# Patient Record
Sex: FEMALE | Race: WHITE | NOT HISPANIC OR LATINO | URBAN - METROPOLITAN AREA
[De-identification: names, ages, dates, MRNs, and addresses within clinical notes are randomized per-mention and may not be internally consistent; named-entity substitution may affect disease eponyms.]

---

## 2020-01-03 ENCOUNTER — APPOINTMENT (OUTPATIENT)
Dept: URBAN - METROPOLITAN AREA SURGERY 18 | Age: 70
Setting detail: DERMATOLOGY
End: 2020-01-06

## 2020-01-03 DIAGNOSIS — L30.9 DERMATITIS, UNSPECIFIED: ICD-10-CM

## 2020-01-03 DIAGNOSIS — L29.9 PRURITUS, UNSPECIFIED: ICD-10-CM

## 2020-01-03 PROCEDURE — OTHER MEDICATION COUNSELING: OTHER

## 2020-01-03 PROCEDURE — OTHER PRESCRIPTION: OTHER

## 2020-01-03 PROCEDURE — OTHER BIOPSY BY PUNCH METHOD: OTHER

## 2020-01-03 PROCEDURE — OTHER DIAGNOSIS COMMENT: OTHER

## 2020-01-03 PROCEDURE — 11104 PUNCH BX SKIN SINGLE LESION: CPT

## 2020-01-03 PROCEDURE — 99203 OFFICE O/P NEW LOW 30 MIN: CPT | Mod: 25

## 2020-01-03 PROCEDURE — OTHER COUNSELING: OTHER

## 2020-01-03 RX ORDER — CLOBETASOL PROPIONATE 0.5 MG/G
OINTMENT TOPICAL
Qty: 1 | Refills: 2 | Status: ERX

## 2020-01-03 RX ORDER — HYDROCORTISONE 25 MG/G
CREAM TOPICAL
Qty: 1 | Refills: 0 | Status: ERX

## 2020-01-03 ASSESSMENT — LOCATION ZONE DERM
LOCATION ZONE: LEG
LOCATION ZONE: NECK
LOCATION ZONE: TRUNK
LOCATION ZONE: ARM
LOCATION ZONE: FACE

## 2020-01-03 ASSESSMENT — LOCATION DETAILED DESCRIPTION DERM
LOCATION DETAILED: RIGHT PROXIMAL PRETIBIAL REGION
LOCATION DETAILED: INFERIOR THORACIC SPINE
LOCATION DETAILED: RIGHT INFERIOR LATERAL NECK
LOCATION DETAILED: RIGHT ANTECUBITAL SKIN
LOCATION DETAILED: RIGHT PROXIMAL DORSAL FOREARM
LOCATION DETAILED: LEFT PROXIMAL PRETIBIAL REGION
LOCATION DETAILED: LOWER STERNUM
LOCATION DETAILED: LEFT PROXIMAL DORSAL FOREARM
LOCATION DETAILED: RIGHT INFERIOR CENTRAL MALAR CHEEK

## 2020-01-03 ASSESSMENT — LOCATION SIMPLE DESCRIPTION DERM
LOCATION SIMPLE: CHEST
LOCATION SIMPLE: RIGHT PRETIBIAL REGION
LOCATION SIMPLE: RIGHT UPPER ARM
LOCATION SIMPLE: RIGHT FOREARM
LOCATION SIMPLE: RIGHT ANTERIOR NECK
LOCATION SIMPLE: LEFT PRETIBIAL REGION
LOCATION SIMPLE: RIGHT CHEEK
LOCATION SIMPLE: UPPER BACK
LOCATION SIMPLE: LEFT FOREARM

## 2020-01-03 NOTE — PROCEDURE: BIOPSY BY PUNCH METHOD
Bill For Surgical Tray: no
X Depth Of Punch In Cm (Optional): 0
Home Suture Removal Text: Patient was provided a home suture removal kit and will remove their sutures at home.  If they have any questions or difficulties they will call the office.
Dressing: bandage
Biopsy Type: H and E
Consent: Written consent was obtained and risks were reviewed including but not limited to scarring, infection, bleeding, scabbing, incomplete removal, nerve damage and allergy to anesthesia.
Path Notes (To The Dermatopathologist): Patient has MDS not doing chemo
Detail Level: Detailed
Notification Instructions: Patient will be notified of biopsy results. However, patient instructed to call the office if not contacted within 2 weeks.
Wound Care: Petrolatum
Post-Care Instructions: I reviewed with the patient in detail post-care instructions. Patient is to keep the biopsy site dry overnight, and then apply bacitracin twice daily until healed. Patient may apply hydrogen peroxide soaks to remove any crusting.
Hemostasis: None
Suture Removal: 10 days
Anesthesia Volume In Cc (Will Not Render If 0): 0.5
Punch Size In Mm: 4
Was A Bandage Applied: Yes
Epidermal Sutures: 4-0 Prolene
Billing Type: Third-Party Bill
Anesthesia Type: 1% lidocaine with epinephrine

## 2020-01-03 NOTE — PROCEDURE: MEDICATION COUNSELING
Xelmarthaz Pregnancy And Lactation Text: This medication is Pregnancy Category D and is not considered safe during pregnancy.  The risk during breast feeding is also uncertain.

## 2020-01-03 NOTE — PROCEDURE: DIAGNOSIS COMMENT
Comment: H/o MDS with neutropenia and thrombocytopenia, managed by Oncology.  Dermatitis due to unclear etiology.  Differential Diagnosis includes Sweet's vs Vasculitis vs other.  Biopsy today.  Start topical steroids.  F/u in 10 days.
Detail Level: Simple
Comment: Pt has h/o MDS which is managed by heme/onc.  Pruritus is likely related to dermatitis.  Labs from 12/2019 reviewed and scanned in.  Will treat dermatitis and see if pruritus improves.  Start topical steroids. \\n F/u in 10 days.

## 2020-01-07 ENCOUNTER — APPOINTMENT (OUTPATIENT)
Dept: URBAN - METROPOLITAN AREA SURGERY 18 | Age: 70
Setting detail: DERMATOLOGY
End: 2020-01-10

## 2020-01-07 ENCOUNTER — RX ONLY (RX ONLY)
Age: 70
End: 2020-01-07

## 2020-01-07 DIAGNOSIS — T81.89 OTHER COMPLICATIONS OF PROCEDURES, NOT ELSEWHERE CLASSIFIED: ICD-10-CM

## 2020-01-07 PROBLEM — T81.89XA OTHER COMPLICATIONS OF PROCEDURES, NOT ELSEWHERE CLASSIFIED, INITIAL ENCOUNTER: Status: ACTIVE | Noted: 2020-01-07

## 2020-01-07 PROCEDURE — OTHER DIAGNOSIS COMMENT: OTHER

## 2020-01-07 PROCEDURE — OTHER MEDICATION COUNSELING: OTHER

## 2020-01-07 PROCEDURE — OTHER SUTURE REMOVAL (NO GLOBAL PERIOD): OTHER

## 2020-01-07 PROCEDURE — OTHER ORDER TESTS: OTHER

## 2020-01-07 PROCEDURE — OTHER OTHER: OTHER

## 2020-01-07 PROCEDURE — OTHER COUNSELING: OTHER

## 2020-01-07 PROCEDURE — OTHER PRESCRIPTION: OTHER

## 2020-01-07 PROCEDURE — 99213 OFFICE O/P EST LOW 20 MIN: CPT

## 2020-01-07 RX ORDER — MUPIROCIN 20 MG/G
OINTMENT TOPICAL
Qty: 1 | Refills: 0 | Status: ERX

## 2020-01-07 RX ORDER — BETAMETHASONE DIPROPIONATE 0.5 MG/G
OINTMENT TOPICAL
Qty: 1 | Refills: 2 | Status: ERX

## 2020-01-07 ASSESSMENT — LOCATION ZONE DERM: LOCATION ZONE: ARM

## 2020-01-07 ASSESSMENT — LOCATION DETAILED DESCRIPTION DERM: LOCATION DETAILED: RIGHT ANTECUBITAL SKIN

## 2020-01-07 ASSESSMENT — LOCATION SIMPLE DESCRIPTION DERM: LOCATION SIMPLE: RIGHT UPPER ARM

## 2020-01-07 NOTE — PROCEDURE: DIAGNOSIS COMMENT
Comment: Pt has a significant amount of edema at the site of a previous punch biopsy that was done on 01/03/20.  Unclear etiology of edema. Differential diagnosis includes infectious etiology vs hematoma. \\n Biopsy was done to evaluate dermatitis.  Suture was removed today.  No purulence.  Bacterial culture taken.  Start Mupirocin ointment and Doxcycline.  She has an appointment for an US this afternoon, which was ordered by her oncologist Dr. Charal MD, she should keep this apt.  She feels well today but if she develops fever, chills, n/v/d she should go the ER for evaluation.  F/u with me in 3 days.  Dr. Gerardo Villalobos evaluated the patient today as well and agrees with this plan. Comment: Pt has a significant amount of edema at the site of a previous punch biopsy that was done on 01/03/20.  Unclear etiology of edema. Differential diagnosis includes infectious etiology vs hematoma. \\n Biopsy was done to evaluate dermatitis.  Suture was removed today.  No purulence.  Bacterial culture taken.  Start Mupirocin ointment and Doxcycline.  She has an appointment for an US this afternoon, which was ordered by her oncologist Dr. Charla MD, she should keep this apt.  She feels well today but if she develops fever, chills, n/v/d she should go the ER for evaluation.  F/u with me in 3 days.  Dr. Gerardo Villalobos evaluated the patient today as well and agrees with this plan.

## 2020-01-07 NOTE — PROCEDURE: OTHER
Detail Level: Zone
Note Text (......Xxx Chief Complaint.): This diagnosis correlates with the
Other (Free Text): Keep Ultrasound this afternoon

## 2020-01-07 NOTE — HPI: WOUND, UPPER EXTREMITY
Is The Wound New Or Recurrent?: new
How Severe Is It?: moderate
Is This A New Presentation, Or A Follow-Up?: Upper Extremity Wound
Additional History: Patient is concerned regarding the biopsy site. Patient saw her Hematologists yesterday and ordered a Ultrasound this afternoon.

## 2020-01-08 ENCOUNTER — APPOINTMENT (OUTPATIENT)
Dept: URBAN - METROPOLITAN AREA SURGERY 18 | Age: 70
Setting detail: DERMATOLOGY
End: 2020-01-10

## 2020-01-08 DIAGNOSIS — L30.9 DERMATITIS, UNSPECIFIED: ICD-10-CM

## 2020-01-08 PROCEDURE — OTHER COUNSELING: OTHER

## 2020-01-08 PROCEDURE — OTHER BIOPSY BY PUNCH METHOD: OTHER

## 2020-01-08 PROCEDURE — OTHER TREATMENT REGIMEN: OTHER

## 2020-01-08 PROCEDURE — OTHER PRESCRIPTION: OTHER

## 2020-01-08 PROCEDURE — 11104 PUNCH BX SKIN SINGLE LESION: CPT

## 2020-01-08 RX ORDER — BETAMETHASONE DIPROPIONATE 0.5 MG/G
OINTMENT TOPICAL
Qty: 1 | Refills: 2

## 2020-01-08 ASSESSMENT — LOCATION ZONE DERM: LOCATION ZONE: TRUNK

## 2020-01-08 ASSESSMENT — LOCATION DETAILED DESCRIPTION DERM: LOCATION DETAILED: RIGHT MEDIAL SUPERIOR CHEST

## 2020-01-08 ASSESSMENT — LOCATION SIMPLE DESCRIPTION DERM: LOCATION SIMPLE: CHEST

## 2020-01-08 NOTE — HPI: RASH
What Type Of Note Output Would You Prefer (Optional)?: Standard Output
How Severe Is Your Rash?: moderate
Is This A New Presentation, Or A Follow-Up?: Follow Up Rash
Additional History: Patient is here for an additional biopsy.

## 2020-01-08 NOTE — PROCEDURE: BIOPSY BY PUNCH METHOD
Epidermal Sutures: 4-0 Nylon
Consent: Written consent was obtained and risks were reviewed including but not limited to scarring, infection, bleeding, scabbing, incomplete removal, nerve damage and allergy to anesthesia.
X Size Of Lesion In Cm (Optional): 0
Render Path Notes In Note?: No
Billing Type: Third-Party Bill
Home Suture Removal Text: Patient was provided a home suture removal kit and will remove their sutures at home.  If they have any questions or difficulties they will call the office.
Wound Care: Petrolatum
Was A Bandage Applied: Yes
Detail Level: Detailed
Notification Instructions: Patient will be notified of biopsy results. However, patient instructed to call the office if not contacted within 2 weeks.
Biopsy Type: H and E
Post-Care Instructions: I reviewed with the patient in detail post-care instructions. Patient is to keep the biopsy site dry overnight, and then apply bacitracin twice daily until healed. Patient may apply hydrogen peroxide soaks to remove any crusting.
Hemostasis: None
Dressing: bandage
Anesthesia Type: 1% lidocaine with epinephrine
Suture Removal: 10 days
Anesthesia Volume In Cc (Will Not Render If 0): 0.5
Punch Size In Mm: 4

## 2020-01-08 NOTE — PROCEDURE: TREATMENT REGIMEN
Continue Regimen: Hydrocortisone 2.5% cream to face as directed
Discontinue Regimen: clobetasol
Initiate Treatment: betamethasone dipropionate
Detail Level: Zone

## 2020-01-10 ENCOUNTER — APPOINTMENT (OUTPATIENT)
Dept: URBAN - METROPOLITAN AREA SURGERY 18 | Age: 70
Setting detail: DERMATOLOGY
End: 2020-01-15

## 2020-01-10 DIAGNOSIS — L30.9 DERMATITIS, UNSPECIFIED: ICD-10-CM

## 2020-01-10 DIAGNOSIS — T81.89 OTHER COMPLICATIONS OF PROCEDURES, NOT ELSEWHERE CLASSIFIED: ICD-10-CM

## 2020-01-10 DIAGNOSIS — L81.0 POSTINFLAMMATORY HYPERPIGMENTATION: ICD-10-CM

## 2020-01-10 PROBLEM — T81.89XA OTHER COMPLICATIONS OF PROCEDURES, NOT ELSEWHERE CLASSIFIED, INITIAL ENCOUNTER: Status: ACTIVE | Noted: 2020-01-10

## 2020-01-10 PROCEDURE — OTHER TREATMENT REGIMEN: OTHER

## 2020-01-10 PROCEDURE — OTHER PRESCRIPTION: OTHER

## 2020-01-10 PROCEDURE — OTHER DIAGNOSIS COMMENT: OTHER

## 2020-01-10 PROCEDURE — OTHER MEDICATION COUNSELING: OTHER

## 2020-01-10 PROCEDURE — 99214 OFFICE O/P EST MOD 30 MIN: CPT

## 2020-01-10 PROCEDURE — OTHER COUNSELING: OTHER

## 2020-01-10 RX ORDER — TRIAMCINOLONE ACETONIDE 1 MG/G
CREAM TOPICAL
Qty: 1 | Refills: 2 | Status: ERX | COMMUNITY
Start: 2020-01-10

## 2020-01-10 ASSESSMENT — LOCATION SIMPLE DESCRIPTION DERM
LOCATION SIMPLE: LEFT LOWER BACK
LOCATION SIMPLE: ABDOMEN
LOCATION SIMPLE: LEFT UPPER ARM
LOCATION SIMPLE: LEFT THIGH
LOCATION SIMPLE: RIGHT UPPER ARM
LOCATION SIMPLE: RIGHT THIGH
LOCATION SIMPLE: UPPER BACK

## 2020-01-10 ASSESSMENT — LOCATION DETAILED DESCRIPTION DERM
LOCATION DETAILED: RIGHT ANTERIOR PROXIMAL THIGH
LOCATION DETAILED: RIGHT ANTERIOR DISTAL THIGH
LOCATION DETAILED: RIGHT ANTECUBITAL SKIN
LOCATION DETAILED: PERIUMBILICAL SKIN
LOCATION DETAILED: LEFT ANTERIOR DISTAL UPPER ARM
LOCATION DETAILED: RIGHT ANTERIOR DISTAL UPPER ARM
LOCATION DETAILED: EPIGASTRIC SKIN
LOCATION DETAILED: INFERIOR THORACIC SPINE
LOCATION DETAILED: LEFT SUPERIOR MEDIAL MIDBACK
LOCATION DETAILED: LEFT ANTERIOR PROXIMAL THIGH
LOCATION DETAILED: LEFT ANTERIOR DISTAL THIGH

## 2020-01-10 ASSESSMENT — LOCATION ZONE DERM
LOCATION ZONE: ARM
LOCATION ZONE: TRUNK
LOCATION ZONE: LEG

## 2020-01-10 NOTE — PROCEDURE: TREATMENT REGIMEN
Detail Level: Zone
Initiate Treatment: TAC 0.1% cream
Discontinue Regimen: clobetasol
Continue Regimen: hydrocortisone 2.5% cream
Continue Regimen: Doxycycline hyclate 100 mg capsule and mupirocin 2% ointment

## 2020-01-10 NOTE — PROCEDURE: DIAGNOSIS COMMENT
1. Reviewed echocardiogram results with patient in the clinic showing worsening PA pressures and mitral valve gradient.   2. Recommend LATESHA to evaluate mitral valve bioprosthesis closer.   3.  Start metoprolol XL 25mg daily. She can start this at night. BP target <130/80 and HR 60-80s.   4. To call with BP and HR readings in 1 week to nurse coordinator to consider titration of BB therapy.   5. Weights daily. To call with 3 lb weight gain overnight or 5 lb weight gain in one week.   6. To limit sodium to 2000 mg daily and fluids to 64 ounces (2000 mL) daily.   7. To avoid the use of NSAIDs, including but not limited to Ibuprofen, Advil and Aleve. Encouraged to check with community pharmacist with all over-the-counter medications to ensure product does not contain NSAIDs.    8. Any elective bowel surgery to be postponed until LATESHA completed and evaluated.   9. Follow-up to be determined after completion of LATESHA.      
Comment: Re-biopsy of dermatitis on 01/08/2020 showed, \"Dermal infiltrate of immature myeloid cells\".  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 In order to rule out leukemia cutis the pathologist recommended a repeat bone marrow biopsy, peripheral blood smear and close clinical f/u with heme/onc.  Pt has Myelodysplastic Syndrome which is being managed by Dr. Charla MD (heme/onc).  I relayed this information to Dr. Dunham's office today.  I spoke with her nurse June because she was not available.  I spoke with Dr. 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  Will send over records with final pathology report. \\nReviewed the pathology report in detail with the patient.  She states she is managed by Dr Isa Dunham locally and she also sees heme/onc at [facility].  She has f/u with Dr. Dunham and Frankie scheduled.  Additional workup and treatment per heme/onc.  \\n\\nI have given pt clobetasol and betamethasone dipropionate in the past but both were too expensive.  She did get a small tube of the clobetasol but it is not enough to treat her rash.  D/c clobetasol.  Start TAC 0.1% cream.  Continue hydrocortisone 2.5% to face.  F/u in 1 wk as scheduled.
Comment: Wound from biopsy site is improving.  Edema has decreased.  Bacterial culture pending.  Continue Doxycycline and mupirocin as prescribed.  F/u in 1 week.
Detail Level: Simple

## 2020-01-10 NOTE — HPI: WOUND
Is The Wound New Or Recurrent?: new
How Severe Is It?: moderate
How Is Your Wound Healing?: healing slowly
Is This A New Presentation, Or A Follow-Up?: Follow Up Wound

## 2020-01-17 ENCOUNTER — APPOINTMENT (OUTPATIENT)
Dept: URBAN - METROPOLITAN AREA SURGERY 18 | Age: 70
Setting detail: DERMATOLOGY
End: 2020-01-20

## 2020-01-17 DIAGNOSIS — L30.9 DERMATITIS, UNSPECIFIED: ICD-10-CM

## 2020-01-17 DIAGNOSIS — T81.89 OTHER COMPLICATIONS OF PROCEDURES, NOT ELSEWHERE CLASSIFIED: ICD-10-CM

## 2020-01-17 PROBLEM — T81.89XA OTHER COMPLICATIONS OF PROCEDURES, NOT ELSEWHERE CLASSIFIED, INITIAL ENCOUNTER: Status: ACTIVE | Noted: 2020-01-17

## 2020-01-17 PROCEDURE — OTHER MEDICATION COUNSELING: OTHER

## 2020-01-17 PROCEDURE — OTHER DIAGNOSIS COMMENT: OTHER

## 2020-01-17 PROCEDURE — OTHER COUNSELING: OTHER

## 2020-01-17 PROCEDURE — 99213 OFFICE O/P EST LOW 20 MIN: CPT

## 2020-01-17 ASSESSMENT — LOCATION DETAILED DESCRIPTION DERM
LOCATION DETAILED: RIGHT ANTERIOR DISTAL THIGH
LOCATION DETAILED: INFERIOR THORACIC SPINE
LOCATION DETAILED: RIGHT ANTERIOR DISTAL UPPER ARM
LOCATION DETAILED: EPIGASTRIC SKIN
LOCATION DETAILED: LEFT ANTERIOR DISTAL UPPER ARM
LOCATION DETAILED: LEFT ANTERIOR PROXIMAL THIGH

## 2020-01-17 ASSESSMENT — LOCATION SIMPLE DESCRIPTION DERM
LOCATION SIMPLE: ABDOMEN
LOCATION SIMPLE: LEFT UPPER ARM
LOCATION SIMPLE: LEFT THIGH
LOCATION SIMPLE: UPPER BACK
LOCATION SIMPLE: RIGHT THIGH
LOCATION SIMPLE: RIGHT UPPER ARM

## 2020-01-17 ASSESSMENT — LOCATION ZONE DERM
LOCATION ZONE: ARM
LOCATION ZONE: LEG
LOCATION ZONE: TRUNK

## 2020-01-17 NOTE — HPI: WOUND
How Severe Is It?: mild
How Is Your Wound Healing?: healing slowly
Is This A New Presentation, Or A Follow-Up?: Follow Up Wound

## 2020-01-17 NOTE — PROCEDURE: DIAGNOSIS COMMENT
Comment: No suture located at previous biopsy site on chest.  Pt states the suture came out several days ago.  Biopsy site is healing well.  Dermatitis due to unclear etiology.  Biopsy from 01/08/2020 showed, \"Dermal infiltrate of immature myeloid cells\".  Pathologist differential diagnosis is histiocytoid sweet's vs leukemai cutis.  Pt has a diagnosis of Myelodysplastic Syndrome.  Pathologist recommended a repeat bone marrow biopsy and peripheral blood smear in order to rule out myeloid leukemia cutis.  Pt is followed locally by Dr. Charla MD (heme/onc) and Dr. Star Ann MD heme/onc at [place].  Dr. Dunham is aware of the recent pathology results.  Per patient request, I contacted Dr. Ann today and reviewed the pathology results as well.  She has an appointment with Dr. Ann on 01/22/2020.  Additional workup and treatment per heme/onc.  For dermatitis, continue topical steroids and f/u in 2 weeks. Comment: No suture located at previous biopsy site on chest.  Pt states the suture came out several days ago.  Biopsy site is healing well.  Dermatitis due to unclear etiology.  Biopsy from 01/08/2020 showed, \"Dermal infiltrate of immature myeloid cells\".  Pathologist differential diagnosis is histiocytoid sweet's vs leukemai cutis.  Pt has a diagnosis of Myelodysplastic Syndrome.  Pathologist recommended a repeat bone marrow biopsy and peripheral blood smear in order to rule out myeloid leukemia cutis.  Pt is followed locally by Dr. Charla MD (heme/onc) and Dr. Star Ann MD heme/onc at [place].  Dr. Dunham is aware of the recent pathology results.  Per patient request, I contacted Dr. Ann today and reviewed the pathology results as well.  She has an appointment with Dr. Ann on 01/22/2020.  Additional workup and treatment per heme/onc.  For dermatitis, continue topical steroids and f/u in 2 weeks.

## 2020-01-17 NOTE — PROCEDURE: DIAGNOSIS COMMENT
Comment: Much improved.  Resolving.  Continue doxycycline and mupirocin as prescribed.  f/u 2 weeks.

## 2020-01-31 ENCOUNTER — APPOINTMENT (OUTPATIENT)
Dept: URBAN - METROPOLITAN AREA SURGERY 18 | Age: 70
Setting detail: DERMATOLOGY
End: 2020-02-03

## 2020-01-31 DIAGNOSIS — L81.8 OTHER SPECIFIED DISORDERS OF PIGMENTATION: ICD-10-CM

## 2020-01-31 DIAGNOSIS — L30.9 DERMATITIS, UNSPECIFIED: ICD-10-CM

## 2020-01-31 DIAGNOSIS — D485 NEOPLASM OF UNCERTAIN BEHAVIOR OF SKIN: ICD-10-CM

## 2020-01-31 PROBLEM — D48.5 NEOPLASM OF UNCERTAIN BEHAVIOR OF SKIN: Status: ACTIVE | Noted: 2020-01-31

## 2020-01-31 PROCEDURE — OTHER BIOPSY BY SHAVE METHOD: OTHER

## 2020-01-31 PROCEDURE — OTHER DIAGNOSIS COMMENT: OTHER

## 2020-01-31 PROCEDURE — OTHER MEDICATION COUNSELING: OTHER

## 2020-01-31 PROCEDURE — OTHER COUNSELING: OTHER

## 2020-01-31 PROCEDURE — 11102 TANGNTL BX SKIN SINGLE LES: CPT

## 2020-01-31 PROCEDURE — 99213 OFFICE O/P EST LOW 20 MIN: CPT | Mod: 25

## 2020-01-31 PROCEDURE — OTHER TREATMENT REGIMEN: OTHER

## 2020-01-31 ASSESSMENT — LOCATION ZONE DERM
LOCATION ZONE: ARM
LOCATION ZONE: LEG
LOCATION ZONE: TRUNK

## 2020-01-31 ASSESSMENT — LOCATION SIMPLE DESCRIPTION DERM
LOCATION SIMPLE: LOWER BACK
LOCATION SIMPLE: RIGHT FOREARM
LOCATION SIMPLE: LEFT FOREARM
LOCATION SIMPLE: LEFT PRETIBIAL REGION
LOCATION SIMPLE: ABDOMEN
LOCATION SIMPLE: RIGHT PRETIBIAL REGION

## 2020-01-31 ASSESSMENT — LOCATION DETAILED DESCRIPTION DERM
LOCATION DETAILED: RIGHT VENTRAL PROXIMAL FOREARM
LOCATION DETAILED: RIGHT DISTAL PRETIBIAL REGION
LOCATION DETAILED: SUPERIOR LUMBAR SPINE
LOCATION DETAILED: LEFT DISTAL PRETIBIAL REGION
LOCATION DETAILED: RIGHT PROXIMAL DORSAL FOREARM
LOCATION DETAILED: PERIUMBILICAL SKIN
LOCATION DETAILED: LEFT VENTRAL PROXIMAL FOREARM

## 2020-01-31 NOTE — PROCEDURE: BIOPSY BY SHAVE METHOD
Bill For Surgical Tray: no
Electrodesiccation And Curettage Text: The wound bed was treated with electrodesiccation and curettage after the biopsy was performed.
Biopsy Type: H and E
Silver Nitrate Text: The wound bed was treated with silver nitrate after the biopsy was performed.
Post-Care Instructions: I reviewed with the patient in detail post-care instructions. Patient is to keep the biopsy site covered until healed.  Apply vaseline twice daily until healed.
Size Of Lesion In Cm: 1
Dressing: bandage
Curettage Text: The wound bed was treated with curettage after the biopsy was performed.
Detail Level: Detailed
Anesthesia Type: 1% lidocaine with epinephrine
Biopsy Method: Personna blade
Notification Instructions: Patient will be notified of biopsy results. However, patient instructed to call the office if not contacted within 2 weeks.
Type Of Destruction Used: Curettage
Was A Bandage Applied: Yes
Cryotherapy Text: The wound bed was treated with cryotherapy after the biopsy was performed.
X Size Of Lesion In Cm: 0.9
Billing Type: Third-Party Bill
Wound Care: Petrolatum
Depth Of Biopsy: dermis
Anesthesia Volume In Cc (Will Not Render If 0): 0.5
Additional Anesthesia Volume In Cc (Will Not Render If 0): 0
Electrodesiccation Text: The wound bed was treated with electrodesiccation after the biopsy was performed.
Hemostasis: Aluminum Chloride
Consent: Written consent was obtained and risks were reviewed including but not limited to scarring, infection, bleeding, scabbing, incomplete removal, nerve damage and allergy to anesthesia.

## 2020-01-31 NOTE — PROCEDURE: DIAGNOSIS COMMENT
Comment: Pathology showed, \"Dermal infiltrate of immature myeloid cells (please see comment)\".  DDX includes histiocytoid Sweet's vs leukemia cutis.  Pt had repeat bone marrow biopsy at [facility] with Dr. Rizvi last week, the results are pending.  She is well controlled with TAC 0.1% cream and hydrocortisone cream 2.5% and dermatitis appears to be resolving.  Use TAC as directed for two weeks then stop.  D/c hydrocortisone cream.  F/u with Dr. Rizvi and Dr. Ovalle as scheduled.  F/u with me in 1 mo. Comment: Pathology showed, \"Dermal infiltrate of immature myeloid cells (please see comment)\".  DDX includes histiocytoid Sweet's vs leukemia cutis.  Pt had repeat bone marrow biopsy at [facility] with Dr. Rizvi last week, the results are pending.  She is well controlled with TAC 0.1% cream and hydrocortisone cream 2.5% and dermatitis appears to be resolving.  Use TAC as directed for two weeks then stop.  D/c hydrocortisone cream.  F/u with Dr. Rizvi and Dr. Ovalle as scheduled.  F/u with me in 1 mo.

## 2020-02-05 ENCOUNTER — APPOINTMENT (OUTPATIENT)
Dept: URBAN - METROPOLITAN AREA SURGERY 18 | Age: 70
Setting detail: DERMATOLOGY
End: 2020-02-07

## 2020-02-05 VITALS — HEART RATE: 60 BPM | RESPIRATION RATE: 14 BRPM | SYSTOLIC BLOOD PRESSURE: 119 MMHG | DIASTOLIC BLOOD PRESSURE: 68 MMHG

## 2020-02-05 PROBLEM — D04.9 CARCINOMA IN SITU OF SKIN, UNSPECIFIED: Status: ACTIVE | Noted: 2020-02-05

## 2020-02-05 PROCEDURE — OTHER DEFER: OTHER

## 2020-02-05 PROCEDURE — OTHER ADDITIONAL NOTES: OTHER

## 2020-02-05 NOTE — PROCEDURE: DEFER
Introduction Text (Please End With A Colon): The patient has recently had a bone marrow biopsy and has a previous diagnosis of myelodysplastic syndrome. Over the last 4-6 weeks her platelets have decreased from 88,000 to approximately 54,000. Her primary care provider and oncologist are aware and following. We discussed the risks of surgery with a platelet count that is trending down and currently near 50,000. The risk for postoperative complications is increased. Given the diagnosis (SCIS) postponing surgery for 4-6 weeks until her next CBC was felt to be the most prudent course of action. She will be monitored and followed closely by her PCP and heme/onc. She will fax us a copy of her next CBC and should the platelets stabilize +/- trend upward we will plan for surgery :
Reason To Defer Override: Thrombocytopenia
Detail Level: Detailed
Instructions (Optional): Patient to have CBC labs performed on 02/17/20 and have a copy sent to our office.

## 2020-02-05 NOTE — PROCEDURE: ADDITIONAL NOTES
Detail Level: Simple
Additional Notes: The patient has recently had a bone marrow biopsy and has a previous diagnosis of myelodysplastic syndrome. Over the last 4-6 weeks her platelets have decreased from 88,000 to approximately 54,000. Her primary care provider and oncologist are aware and following. We discussed the risks of surgery with a platelet count that is trending down and currently near 50,000. The risk for postoperative complications is increased. Given the diagnosis (SCIS) postponing surgery for 4-6 weeks until her next CBC was felt to be the most prudent course of action. She will be monitored and followed closely by her PCP and heme/onc. She will fax us a copy of her next CBC and should the platelets stabilize +/- trend upward we will plan for surgery

## 2020-02-19 ENCOUNTER — APPOINTMENT (OUTPATIENT)
Dept: URBAN - METROPOLITAN AREA SURGERY 18 | Age: 70
Setting detail: DERMATOLOGY
End: 2020-02-20

## 2020-02-19 VITALS — SYSTOLIC BLOOD PRESSURE: 120 MMHG | DIASTOLIC BLOOD PRESSURE: 74 MMHG | HEART RATE: 71 BPM

## 2020-02-19 PROBLEM — D04.61 CARCINOMA IN SITU OF SKIN OF RIGHT UPPER LIMB, INCLUDING SHOULDER: Status: ACTIVE | Noted: 2020-02-19

## 2020-02-19 PROCEDURE — 13121 CMPLX RPR S/A/L 2.6-7.5 CM: CPT

## 2020-02-19 PROCEDURE — 17313 MOHS 1 STAGE T/A/L: CPT

## 2020-02-19 PROCEDURE — OTHER MOHS SURGERY: OTHER

## 2020-02-19 PROCEDURE — OTHER DIAGNOSIS COMMENT: OTHER

## 2020-02-19 NOTE — PROCEDURE: DIAGNOSIS COMMENT
Comment: * Patient had platelet count redrawn, trending upward. She also had discussion with her heme/onc who stated procedure would not be contraindicated based upon her current/previous blood draws.
Detail Level: Simple

## 2020-05-29 ENCOUNTER — APPOINTMENT (OUTPATIENT)
Dept: URBAN - METROPOLITAN AREA SURGERY 18 | Age: 70
Setting detail: DERMATOLOGY
End: 2020-06-01

## 2020-05-29 DIAGNOSIS — L30.9 DERMATITIS, UNSPECIFIED: ICD-10-CM

## 2020-05-29 PROCEDURE — 99214 OFFICE O/P EST MOD 30 MIN: CPT

## 2020-05-29 PROCEDURE — OTHER COUNSELING: OTHER

## 2020-05-29 PROCEDURE — OTHER PRESCRIPTION: OTHER

## 2020-05-29 PROCEDURE — OTHER DIAGNOSIS COMMENT: OTHER

## 2020-05-29 RX ORDER — ACYCLOVIR 50 MG/G
OINTMENT TOPICAL
Qty: 1 | Refills: 0 | Status: CANCELLED
Stop reason: CLARIF

## 2020-05-29 ASSESSMENT — LOCATION DETAILED DESCRIPTION DERM: LOCATION DETAILED: LEFT INFERIOR MEDIAL LOWER BACK

## 2020-05-29 ASSESSMENT — LOCATION SIMPLE DESCRIPTION DERM: LOCATION SIMPLE: LEFT LOWER BACK

## 2020-05-29 ASSESSMENT — LOCATION ZONE DERM: LOCATION ZONE: TRUNK

## 2020-05-29 NOTE — HPI: RASH
What Type Of Note Output Would You Prefer (Optional)?: Standard Output
Is The Patient Presenting As Previously Scheduled?: Yes
How Severe Is Your Rash?: moderate
Is This A New Presentation, Or A Follow-Up?: Rash
Additional History: Patient states she used a topical in the past that seemed to help, but unsure the name of prescription.

## 2020-06-04 ENCOUNTER — APPOINTMENT (OUTPATIENT)
Dept: URBAN - METROPOLITAN AREA SURGERY 18 | Age: 70
Setting detail: DERMATOLOGY
End: 2020-06-10

## 2020-06-04 DIAGNOSIS — L30.9 DERMATITIS, UNSPECIFIED: ICD-10-CM

## 2020-06-04 DIAGNOSIS — D485 NEOPLASM OF UNCERTAIN BEHAVIOR OF SKIN: ICD-10-CM

## 2020-06-04 PROBLEM — D48.5 NEOPLASM OF UNCERTAIN BEHAVIOR OF SKIN: Status: ACTIVE | Noted: 2020-06-04

## 2020-06-04 PROCEDURE — 99214 OFFICE O/P EST MOD 30 MIN: CPT | Mod: 25

## 2020-06-04 PROCEDURE — OTHER BIOPSY BY SHAVE METHOD: OTHER

## 2020-06-04 PROCEDURE — OTHER COUNSELING: OTHER

## 2020-06-04 PROCEDURE — 11102 TANGNTL BX SKIN SINGLE LES: CPT

## 2020-06-04 PROCEDURE — OTHER TREATMENT REGIMEN: OTHER

## 2020-06-04 PROCEDURE — OTHER DIAGNOSIS COMMENT: OTHER

## 2020-06-04 ASSESSMENT — LOCATION DETAILED DESCRIPTION DERM
LOCATION DETAILED: LEFT SUPERIOR MEDIAL LOWER BACK
LOCATION DETAILED: RIGHT VENTRAL DISTAL FOREARM
LOCATION DETAILED: LEFT MEDIAL KNEE

## 2020-06-04 ASSESSMENT — LOCATION SIMPLE DESCRIPTION DERM
LOCATION SIMPLE: LEFT LOWER BACK
LOCATION SIMPLE: RIGHT FOREARM
LOCATION SIMPLE: LEFT KNEE

## 2020-06-04 ASSESSMENT — LOCATION ZONE DERM
LOCATION ZONE: LEG
LOCATION ZONE: ARM
LOCATION ZONE: TRUNK

## 2020-06-04 NOTE — PROCEDURE: DIAGNOSIS COMMENT
Comment: I favor resolving HSV.  Responded well to topical acyclovir.  She states she has had similar rashes that have come and gone over the years on her back, they always occur in the same place. Discussed that if it recurs, I would recommend a treatment with Valtrex.  Will recheck in 1 wk in order to make sure it has resolved.  If it worsens prior to her v/u she will call.  She v/u.

## 2020-06-04 NOTE — PROCEDURE: TREATMENT REGIMEN
Detail Level: Zone
Plan: Request records from oncologist and review
Discontinue Regimen: acyclovir 5 % topical ointment
Initiate Treatment: TAC 0.1% cream BID x 2 weeks, she has this at home.

## 2020-06-04 NOTE — PROCEDURE: DIAGNOSIS COMMENT
Comment: Scattered excoriations on the right volar foearm.  No primary lesions today.  Possibly Contact dermatitis.  She does have h/o MDS which is being managed by Dr. Dunham and oncology/[place].  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  Will get recent oncology records and review.  For now apply TAC 0.1% cream BID.  Avoid manipulation.  F/u in 1 week. Comment: Scattered excoriations on the right volar foearm.  No primary lesions today.  Possibly Contact dermatitis.  She does have h/o MDS which is being managed by Dr. Dunham and oncology/[place].  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  Will get recent oncology records and review.  For now apply TAC 0.1% cream BID.  Avoid manipulation.  F/u in 1 week.

## 2020-06-04 NOTE — PROCEDURE: BIOPSY BY SHAVE METHOD
Hide Topical Anesthesia?: No
Was A Bandage Applied: Yes
Biopsy Type: H and E
Notification Instructions: Patient will be notified of biopsy results. However, patient instructed to call the office if not contacted within 2 weeks.
Type Of Destruction Used: Curettage
Wound Care: Petrolatum
Cryotherapy Text: The wound bed was treated with cryotherapy after the biopsy was performed.
Anesthesia Volume In Cc (Will Not Render If 0): 0.5
Additional Anesthesia Volume In Cc (Will Not Render If 0): 0
Hemostasis: Aluminum Chloride
Size Of Lesion In Cm: 0.8
Electrodesiccation Text: The wound bed was treated with electrodesiccation after the biopsy was performed.
Information: Selecting Yes will display possible errors in your note based on the variables you have selected. This validation is only offered as a suggestion for you. PLEASE NOTE THAT THE VALIDATION TEXT WILL BE REMOVED WHEN YOU FINALIZE YOUR NOTE. IF YOU WANT TO FAX A PRELIMINARY NOTE YOU WILL NEED TO TOGGLE THIS TO 'NO' IF YOU DO NOT WANT IT IN YOUR FAXED NOTE.
Dressing: bandage
Curettage Text: The wound bed was treated with curettage after the biopsy was performed.
X Size Of Lesion In Cm: 0.9
Electrodesiccation And Curettage Text: The wound bed was treated with electrodesiccation and curettage after the biopsy was performed.
Depth Of Biopsy: dermis
Billing Type: Third-Party Bill
Silver Nitrate Text: The wound bed was treated with silver nitrate after the biopsy was performed.
Detail Level: Detailed
Consent: Written consent was obtained and risks were reviewed including but not limited to scarring, infection, bleeding, scabbing, incomplete removal, nerve damage and allergy to anesthesia.
Biopsy Method: Personna blade
Anesthesia Type: 1% lidocaine with epinephrine
Post-Care Instructions: I reviewed with the patient in detail post-care instructions. Patient is to keep the biopsy site covered until healed.  Apply vaseline twice daily until healed.

## 2020-06-11 ENCOUNTER — APPOINTMENT (OUTPATIENT)
Dept: URBAN - METROPOLITAN AREA SURGERY 18 | Age: 70
Setting detail: DERMATOLOGY
End: 2020-06-18

## 2020-06-11 DIAGNOSIS — L81.0 POSTINFLAMMATORY HYPERPIGMENTATION: ICD-10-CM

## 2020-06-11 DIAGNOSIS — F42.4 EXCORIATION (SKIN-PICKING) DISORDER: ICD-10-CM

## 2020-06-11 DIAGNOSIS — L30.9 DERMATITIS, UNSPECIFIED: ICD-10-CM

## 2020-06-11 PROBLEM — S20.409A UNSPECIFIED SUPERFICIAL INJURIES OF UNSPECIFIED BACK WALL OF THORAX, INITIAL ENCOUNTER: Status: ACTIVE | Noted: 2020-06-11

## 2020-06-11 PROCEDURE — 99214 OFFICE O/P EST MOD 30 MIN: CPT

## 2020-06-11 PROCEDURE — OTHER PRESCRIPTION: OTHER

## 2020-06-11 PROCEDURE — OTHER DIAGNOSIS COMMENT: OTHER

## 2020-06-11 PROCEDURE — OTHER TREATMENT REGIMEN: OTHER

## 2020-06-11 PROCEDURE — OTHER COUNSELING: OTHER

## 2020-06-11 RX ORDER — HALOBETASOL PROPIONATE 0.5 MG/G
OINTMENT TOPICAL
Qty: 1 | Refills: 0 | Status: ERX

## 2020-06-11 RX ORDER — HYDROCORTISONE 25 MG/G
OINTMENT TOPICAL
Qty: 1 | Refills: 0 | Status: ERX | COMMUNITY
Start: 2020-06-11

## 2020-06-11 ASSESSMENT — LOCATION SIMPLE DESCRIPTION DERM
LOCATION SIMPLE: LEFT UPPER BACK
LOCATION SIMPLE: RIGHT FOREARM
LOCATION SIMPLE: RIGHT LOWER BACK

## 2020-06-11 ASSESSMENT — LOCATION ZONE DERM
LOCATION ZONE: TRUNK
LOCATION ZONE: ARM

## 2020-06-11 ASSESSMENT — LOCATION DETAILED DESCRIPTION DERM
LOCATION DETAILED: RIGHT SUPERIOR MEDIAL LOWER BACK
LOCATION DETAILED: LEFT MEDIAL UPPER BACK
LOCATION DETAILED: RIGHT VENTRAL DISTAL FOREARM

## 2020-06-11 NOTE — PROCEDURE: DIAGNOSIS COMMENT
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   Previously biopsied. Mild today.  Afebrile.  Pt feels well.   Pt has a h/o MDS and is being followed by [facility] and Dr. Dunham.  Repeat bone marrow several months ago did not show AML.  Need to get Dr. Dunham's and [facility] oncology records and review.  Dermatitis is on the face, neck and right forearm today.  D/c TAC and start halobetasol and hydrocortisone 2.5%.  F/u in 2 weeks or sooner if it worsens or she starts to feel poorly. 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   Previously biopsied. Mild today.  Afebrile.  Pt feels well.   Pt has a h/o MDS and is being followed by [facility] and Dr. Dunham.  Repeat bone marrow several months ago did not show AML.  Need to get Dr. Dunham's and [place] oncology records and review.  Dermatitis is on the face, neck and right forearm today.  D/c TAC and start halobetasol and hydrocortisone 2.5%.  F/u in 2 weeks or sooner if it worsens or she starts to feel poorly.

## 2020-06-26 ENCOUNTER — APPOINTMENT (OUTPATIENT)
Dept: URBAN - METROPOLITAN AREA SURGERY 18 | Age: 70
Setting detail: DERMATOLOGY
End: 2020-07-01

## 2020-06-26 VITALS — TEMPERATURE: 98 F

## 2020-06-26 DIAGNOSIS — Z85.828 PERSONAL HISTORY OF OTHER MALIGNANT NEOPLASM OF SKIN: ICD-10-CM

## 2020-06-26 DIAGNOSIS — F42.4 EXCORIATION (SKIN-PICKING) DISORDER: ICD-10-CM

## 2020-06-26 DIAGNOSIS — L82.1 OTHER SEBORRHEIC KERATOSIS: ICD-10-CM

## 2020-06-26 DIAGNOSIS — D485 NEOPLASM OF UNCERTAIN BEHAVIOR OF SKIN: ICD-10-CM

## 2020-06-26 DIAGNOSIS — L81.0 POSTINFLAMMATORY HYPERPIGMENTATION: ICD-10-CM

## 2020-06-26 DIAGNOSIS — L98.2 FEBRILE NEUTROPHILIC DERMATOSIS [SWEET]: ICD-10-CM

## 2020-06-26 DIAGNOSIS — D22 MELANOCYTIC NEVI: ICD-10-CM

## 2020-06-26 DIAGNOSIS — L57.0 ACTINIC KERATOSIS: ICD-10-CM

## 2020-06-26 PROBLEM — S00.501A UNSPECIFIED SUPERFICIAL INJURY OF LIP, INITIAL ENCOUNTER: Status: ACTIVE | Noted: 2020-06-26

## 2020-06-26 PROBLEM — D48.5 NEOPLASM OF UNCERTAIN BEHAVIOR OF SKIN: Status: ACTIVE | Noted: 2020-06-26

## 2020-06-26 PROBLEM — D22.9 MELANOCYTIC NEVI, UNSPECIFIED: Status: ACTIVE | Noted: 2020-06-26

## 2020-06-26 PROBLEM — S20.409A UNSPECIFIED SUPERFICIAL INJURIES OF UNSPECIFIED BACK WALL OF THORAX, INITIAL ENCOUNTER: Status: ACTIVE | Noted: 2020-06-26

## 2020-06-26 PROCEDURE — OTHER TREATMENT REGIMEN: OTHER

## 2020-06-26 PROCEDURE — 99214 OFFICE O/P EST MOD 30 MIN: CPT | Mod: 25

## 2020-06-26 PROCEDURE — OTHER MEDICATION COUNSELING: OTHER

## 2020-06-26 PROCEDURE — OTHER DIAGNOSIS COMMENT: OTHER

## 2020-06-26 PROCEDURE — OTHER LIQUID NITROGEN: OTHER

## 2020-06-26 PROCEDURE — OTHER COUNSELING: OTHER

## 2020-06-26 PROCEDURE — 11102 TANGNTL BX SKIN SINGLE LES: CPT

## 2020-06-26 PROCEDURE — 17003 DESTRUCT PREMALG LES 2-14: CPT

## 2020-06-26 PROCEDURE — 17000 DESTRUCT PREMALG LESION: CPT | Mod: 59

## 2020-06-26 PROCEDURE — OTHER BIOPSY BY SHAVE METHOD: OTHER

## 2020-06-26 ASSESSMENT — LOCATION DETAILED DESCRIPTION DERM
LOCATION DETAILED: LEFT PROXIMAL CALF
LOCATION DETAILED: RIGHT PROXIMAL DORSAL RING FINGER
LOCATION DETAILED: RIGHT DISTAL DORSAL FOREARM
LOCATION DETAILED: RIGHT PROXIMAL CALF
LOCATION DETAILED: LEFT MEDIAL UPPER BACK
LOCATION DETAILED: RIGHT PROXIMAL DORSAL FOREARM
LOCATION DETAILED: LEFT DISTAL DORSAL FOREARM
LOCATION DETAILED: LEFT PROXIMAL DORSAL FOREARM
LOCATION DETAILED: RIGHT RADIAL DORSAL HAND
LOCATION DETAILED: LEFT DISTAL CALF
LOCATION DETAILED: LEFT INFERIOR VERMILION LIP
LOCATION DETAILED: RIGHT INFERIOR MEDIAL LOWER BACK

## 2020-06-26 ASSESSMENT — LOCATION ZONE DERM
LOCATION ZONE: ARM
LOCATION ZONE: LIP
LOCATION ZONE: HAND
LOCATION ZONE: FINGER
LOCATION ZONE: TRUNK
LOCATION ZONE: LEG

## 2020-06-26 ASSESSMENT — LOCATION SIMPLE DESCRIPTION DERM
LOCATION SIMPLE: LEFT UPPER BACK
LOCATION SIMPLE: LEFT CALF
LOCATION SIMPLE: RIGHT LOWER BACK
LOCATION SIMPLE: RIGHT CALF
LOCATION SIMPLE: RIGHT FOREARM
LOCATION SIMPLE: RIGHT HAND
LOCATION SIMPLE: RIGHT RING FINGER
LOCATION SIMPLE: LEFT FOREARM
LOCATION SIMPLE: LEFT LIP

## 2020-06-26 NOTE — PROCEDURE: DIAGNOSIS COMMENT
Comment: Biopsy proven histiocytoid Sweet's in 01/2020.  Pt has a h/o MDS and is followed closely by [facility]/Dr. Rizvi and Dr. Dunham locally.  Records from Dr. Rizvi and Dr. Dunham reviewed. \\n She feels well today.  Afebrile.  Sweet's is well controlled with topical steroids.  She has found the higher potentcy steroids such as halobetasol, clobetasol and betamethasone dipropionate too expensive so she has declined treatment with these in the past.  Use hydrocortisone 2.5% BID to affected areas on the face x 2 weeks, then prn flares.  Use TAC 0.1% cream BID x 2 weeks on extremities prn flares.  F/u in 3 months, sooner if poorly controlled.  F/u with oncology as scheduled. Comment: Biopsy proven histiocytoid Sweet's in 01/2020.  Pt has a h/o MDS and is followed closely by [facility]/Dr. Rizvi and Dr. Dunham locally.  Records from Dr. Rizvi and Dr. Dunham reviewed. \\n She feels well today.  Afebrile.  Sweet's is well controlled with topical steroids.  She has found the higher potentcy steroids such as halobetasol, clobetasol and betamethasone dipropionate too expensive so she has declined treatment with these in the past.  Use hydrocortisone 2.5% BID to affected areas on the face x 2 weeks, then prn flares.  Use TAC 0.1% cream BID x 2 weeks on extremities prn flares.  F/u in 3 months, sooner if poorly controlled.  F/u with oncology as scheduled.

## 2020-06-26 NOTE — PROCEDURE: TREATMENT REGIMEN
Otc Regimen: Vaseline
Detail Level: Zone
Otc Regimen: Sarna sensitive skin lotion
Initiate Treatment: Hydrocortisone 2.5% ointment BID x 2 weeks
Initiate Treatment: Hydrocortisone 2.5% ointment

## 2020-06-26 NOTE — PROCEDURE: LIQUID NITROGEN
Duration Of Freeze Thaw-Cycle (Seconds): 0
Render Note In Bullet Format When Appropriate: No
Detail Level: Detailed
Number Of Freeze-Thaw Cycles: 1 freeze-thaw cycle
Post-Care Instructions: I reviewed with the patient in detail post-care instructions. Patient is to wear sunprotection, and avoid picking at any of the treated lesions. Pt may apply Vaseline to crusted or scabbing areas.
Consent: The patient's consent was obtained and the risks were discussed including but not limited to risks of crusting, scabbing, blistering, scarring, darker or lighter pigmentary change, recurrence, incomplete removal and infection prior to the procedure.

## 2020-06-26 NOTE — PROCEDURE: BIOPSY BY SHAVE METHOD
Billing Type: Third-Party Bill
Biopsy Method: Personna blade
Anesthesia Type: 1% lidocaine with epinephrine
Render In Bullet Format When Appropriate: No
Electrodesiccation Text: The wound bed was treated with electrodesiccation after the biopsy was performed.
Consent: Written consent was obtained and risks were reviewed including but not limited to scarring, infection, bleeding, scabbing, incomplete removal, nerve damage and allergy to anesthesia.
Post-Care Instructions: I reviewed with the patient in detail post-care instructions. Patient is to keep the biopsy site covered until healed.  Apply vaseline twice daily until healed.
Was A Bandage Applied: Yes
X Size Of Lesion In Cm: 1
Electrodesiccation And Curettage Text: The wound bed was treated with electrodesiccation and curettage after the biopsy was performed.
Depth Of Biopsy: dermis
Notification Instructions: Patient will be notified of biopsy results. However, patient instructed to call the office if not contacted within 2 weeks.
Silver Nitrate Text: The wound bed was treated with silver nitrate after the biopsy was performed.
Detail Level: Detailed
Size Of Lesion In Cm: 1.2
Dressing: bandage
Curettage Text: The wound bed was treated with curettage after the biopsy was performed.
Biopsy Type: H and E
Information: Selecting Yes will display possible errors in your note based on the variables you have selected. This validation is only offered as a suggestion for you. PLEASE NOTE THAT THE VALIDATION TEXT WILL BE REMOVED WHEN YOU FINALIZE YOUR NOTE. IF YOU WANT TO FAX A PRELIMINARY NOTE YOU WILL NEED TO TOGGLE THIS TO 'NO' IF YOU DO NOT WANT IT IN YOUR FAXED NOTE.
Type Of Destruction Used: Curettage
Wound Care: Petrolatum
Anesthesia Volume In Cc (Will Not Render If 0): 0.5
Cryotherapy Text: The wound bed was treated with cryotherapy after the biopsy was performed.
Additional Anesthesia Volume In Cc (Will Not Render If 0): 0
Hemostasis: Aluminum Chloride

## 2020-07-21 ENCOUNTER — APPOINTMENT (OUTPATIENT)
Dept: URBAN - METROPOLITAN AREA SURGERY 18 | Age: 70
Setting detail: DERMATOLOGY
End: 2020-07-21

## 2020-07-21 VITALS — HEART RATE: 64 BPM | SYSTOLIC BLOOD PRESSURE: 148 MMHG | DIASTOLIC BLOOD PRESSURE: 83 MMHG | TEMPERATURE: 97.5 F

## 2020-07-21 PROBLEM — D04.72 CARCINOMA IN SITU OF SKIN OF LEFT LOWER LIMB, INCLUDING HIP: Status: ACTIVE | Noted: 2020-07-21

## 2020-07-21 PROCEDURE — 17313 MOHS 1 STAGE T/A/L: CPT

## 2020-07-21 PROCEDURE — 13121 CMPLX RPR S/A/L 2.6-7.5 CM: CPT

## 2020-07-21 PROCEDURE — OTHER MOHS SURGERY: OTHER

## 2020-07-28 ENCOUNTER — APPOINTMENT (OUTPATIENT)
Dept: URBAN - METROPOLITAN AREA SURGERY 18 | Age: 70
Setting detail: DERMATOLOGY
End: 2020-07-28

## 2020-07-28 VITALS — SYSTOLIC BLOOD PRESSURE: 167 MMHG | DIASTOLIC BLOOD PRESSURE: 97 MMHG | TEMPERATURE: 98 F | HEART RATE: 69 BPM

## 2020-07-28 DIAGNOSIS — Z48.817 ENCOUNTER FOR SURGICAL AFTERCARE FOLLOWING SURGERY ON THE SKIN AND SUBCUTANEOUS TISSUE: ICD-10-CM

## 2020-07-28 PROBLEM — C44.729 SQUAMOUS CELL CARCINOMA OF SKIN OF LEFT LOWER LIMB, INCLUDING HIP: Status: ACTIVE | Noted: 2020-07-28

## 2020-07-28 PROCEDURE — OTHER POST-OP WOUND CHECK: OTHER

## 2020-07-28 PROCEDURE — OTHER COUNSELING: OTHER

## 2020-07-28 PROCEDURE — 17313 MOHS 1 STAGE T/A/L: CPT | Mod: 79

## 2020-07-28 PROCEDURE — 99024 POSTOP FOLLOW-UP VISIT: CPT

## 2020-07-28 PROCEDURE — 13121 CMPLX RPR S/A/L 2.6-7.5 CM: CPT | Mod: 79

## 2020-07-28 PROCEDURE — OTHER MOHS SURGERY: OTHER

## 2020-07-28 ASSESSMENT — LOCATION DETAILED DESCRIPTION DERM: LOCATION DETAILED: LEFT MEDIAL KNEE

## 2020-07-28 ASSESSMENT — LOCATION ZONE DERM: LOCATION ZONE: LEG

## 2020-07-28 ASSESSMENT — LOCATION SIMPLE DESCRIPTION DERM: LOCATION SIMPLE: LEFT KNEE

## 2020-07-28 NOTE — PROCEDURE: POST-OP WOUND CHECK
Add 14600 Cpt? (Important Note: In 2017 The Use Of 83704 Is Being Tracked By Cms To Determine Future Global Period Reimbursement For Global Periods): yes
Additional Comments: Site evaluated and dressed appropriately. Patient instructed she can to get site wet in shower and will begin daily bandage changes. Patient to follow up PRN.
Wound Evaluated By: Dr. Gerardo Villalobos
Detail Level: Detailed

## 2020-07-28 NOTE — PROCEDURE: MOHS SURGERY
Good FM. Feeling some contractions, but no lof/vb. Scheduled for C/S in 2 days. NST reactive, DVP 6cm. TIFFANIE 15cm. S/sx of labor and FM reviewed.    Surgeon/Pathologist Verbiage (Will Incorporate Name Of Surgeon From Intro If Not Blank): operated in two distinct and integrated capacities as the surgeon and pathologist.

## 2020-09-30 ENCOUNTER — APPOINTMENT (OUTPATIENT)
Dept: URBAN - METROPOLITAN AREA SURGERY 18 | Age: 70
Setting detail: DERMATOLOGY
End: 2020-10-04

## 2020-09-30 DIAGNOSIS — L81.0 POSTINFLAMMATORY HYPERPIGMENTATION: ICD-10-CM

## 2020-09-30 DIAGNOSIS — L98.2 FEBRILE NEUTROPHILIC DERMATOSIS [SWEET]: ICD-10-CM

## 2020-09-30 DIAGNOSIS — Z85.828 PERSONAL HISTORY OF OTHER MALIGNANT NEOPLASM OF SKIN: ICD-10-CM

## 2020-09-30 DIAGNOSIS — B00.1 HERPESVIRAL VESICULAR DERMATITIS: ICD-10-CM

## 2020-09-30 PROBLEM — M12.9 ARTHROPATHY, UNSPECIFIED: Status: ACTIVE | Noted: 2020-09-30

## 2020-09-30 PROCEDURE — OTHER DIAGNOSIS COMMENT: OTHER

## 2020-09-30 PROCEDURE — OTHER PRESCRIPTION: OTHER

## 2020-09-30 PROCEDURE — 99214 OFFICE O/P EST MOD 30 MIN: CPT

## 2020-09-30 PROCEDURE — OTHER TREATMENT REGIMEN: OTHER

## 2020-09-30 PROCEDURE — OTHER COUNSELING: OTHER

## 2020-09-30 PROCEDURE — OTHER MEDICATION COUNSELING: OTHER

## 2020-09-30 RX ORDER — TRIAMCINOLONE ACETONIDE 1 MG/G
CREAM TOPICAL
Qty: 1 | Refills: 0 | Status: ERX

## 2020-09-30 ASSESSMENT — LOCATION DETAILED DESCRIPTION DERM
LOCATION DETAILED: RIGHT DISTAL RADIAL DORSAL FOREARM
LOCATION DETAILED: RIGHT INFERIOR MEDIAL LOWER BACK
LOCATION DETAILED: LEFT PROXIMAL DORSAL FOREARM
LOCATION DETAILED: LEFT POPLITEAL SKIN
LOCATION DETAILED: RIGHT DISTAL POSTERIOR THIGH
LOCATION DETAILED: RIGHT INFERIOR MEDIAL MIDBACK

## 2020-09-30 ASSESSMENT — LOCATION ZONE DERM
LOCATION ZONE: ARM
LOCATION ZONE: TRUNK
LOCATION ZONE: LEG

## 2020-09-30 ASSESSMENT — LOCATION SIMPLE DESCRIPTION DERM
LOCATION SIMPLE: LEFT POPLITEAL SKIN
LOCATION SIMPLE: RIGHT LOWER BACK
LOCATION SIMPLE: RIGHT FOREARM
LOCATION SIMPLE: RIGHT POSTERIOR THIGH
LOCATION SIMPLE: LEFT FOREARM

## 2020-09-30 NOTE — PROCEDURE: DIAGNOSIS COMMENT
Comment: Mild, isolated to one patch on the lower back.  Recurrent.  She has a mild outbreak which she has been treating with topical acyclovir.  She declined systemic treatment at this time.  Continue topical acycliovir x 1 week, return if it does not resolve or becomes poorly controlled. She v/u.

## 2020-09-30 NOTE — PROCEDURE: DIAGNOSIS COMMENT
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 Rizvi and Dr. Dunham locally.  She feels well today. Afebrile. Sweet's is well controlled with topical steroids. She has found the higher\\npotentcy steroids such as halobetasol, clobetasol and betamethasone dipropionate too expensive so she declines treatment with these. Use hydrocortisone 2.5% BID to affected areas on the face x 2 weeks, then prn flares. Use TAC 0.1% cream BID x 2 weeks on extremities prn flares.  No oral ulcerations.  She feels well.  Continue to f/u with heme/onc as scheduled.  F/u 3 mo Comment: Biopsy proven histiocytoid Sweet's in 01/2020. Pt has a h/o MDS and is followed closely by [facility]/Dr. Rizvi and Dr. Dunham locally.  She feels well today. Afebrile. Sweet's is well controlled with topical steroids. She has found the higher\\npotentcy steroids such as halobetasol, clobetasol and betamethasone dipropionate too expensive so she declines treatment with these. Use hydrocortisone 2.5% BID to affected areas on the face x 2 weeks, then prn flares. Use TAC 0.1% cream BID x 2 weeks on extremities prn flares.  No oral ulcerations.  She feels well.  Continue to f/u with heme/onc as scheduled.  F/u 3 mo

## 2020-09-30 NOTE — HPI: RASH
How Severe Is Your Rash?: mild
Is This A New Presentation, Or A Follow-Up?: Rash
What Type Of Note Output Would You Prefer (Optional)?: Standard Output
Is This A New Presentation, Or A Follow-Up?: Follow Up Rash

## 2020-11-16 NOTE — PROCEDURE: MEDICATION COUNSELING
Excision Method: 6 mm Punch Rituxan Pregnancy And Lactation Text: This medication is Pregnancy Category C and it isn't know if it is safe during pregnancy. It is unknown if this medication is excreted in breast milk but similar antibodies are known to be excreted.

## 2021-02-18 ENCOUNTER — APPOINTMENT (OUTPATIENT)
Dept: URBAN - METROPOLITAN AREA SURGERY 18 | Age: 71
Setting detail: DERMATOLOGY
End: 2021-02-18

## 2021-02-18 VITALS — TEMPERATURE: 98.1 F

## 2021-06-04 NOTE — PROCEDURE: MEDICATION COUNSELING
detailed exam Doxepin Pregnancy And Lactation Text: This medication is Pregnancy Category C and it isn't known if it is safe during pregnancy. It is also excreted in breast milk and breast feeding isn't recommended.

## 2021-07-21 ENCOUNTER — APPOINTMENT (OUTPATIENT)
Dept: URBAN - METROPOLITAN AREA SURGERY 18 | Age: 71
Setting detail: DERMATOLOGY
End: 2021-07-26

## 2021-07-21 VITALS — TEMPERATURE: 98.4 F

## 2021-07-21 DIAGNOSIS — Z85.828 PERSONAL HISTORY OF OTHER MALIGNANT NEOPLASM OF SKIN: ICD-10-CM

## 2021-07-21 DIAGNOSIS — L30.9 DERMATITIS, UNSPECIFIED: ICD-10-CM

## 2021-07-21 DIAGNOSIS — L81.8 OTHER SPECIFIED DISORDERS OF PIGMENTATION: ICD-10-CM

## 2021-07-21 DIAGNOSIS — F42.4 EXCORIATION (SKIN-PICKING) DISORDER: ICD-10-CM

## 2021-07-21 PROBLEM — T14.8XXA OTHER INJURY OF UNSPECIFIED BODY REGION, INITIAL ENCOUNTER: Status: ACTIVE | Noted: 2021-07-21

## 2021-07-21 PROCEDURE — OTHER MEDICATION COUNSELING: OTHER

## 2021-07-21 PROCEDURE — OTHER TREATMENT REGIMEN: OTHER

## 2021-07-21 PROCEDURE — OTHER COUNSELING: OTHER

## 2021-07-21 PROCEDURE — OTHER BIOPSY BY PUNCH METHOD: OTHER

## 2021-07-21 PROCEDURE — 11104 PUNCH BX SKIN SINGLE LESION: CPT

## 2021-07-21 PROCEDURE — 99213 OFFICE O/P EST LOW 20 MIN: CPT | Mod: 25

## 2021-07-21 PROCEDURE — OTHER PRESCRIPTION: OTHER

## 2021-07-21 RX ORDER — CLOBETASOL PROPIONATE 0.5 MG/ML
SOLUTION TOPICAL
Qty: 1 | Refills: 1 | Status: ERX | COMMUNITY
Start: 2021-07-21

## 2021-07-21 RX ORDER — BETAMETHASONE DIPROPIONATE 0.5 MG/G
OINTMENT TOPICAL
Qty: 3 | Refills: 2 | Status: ERX | COMMUNITY
Start: 2021-07-21

## 2021-07-21 RX ORDER — HYDROCORTISONE 25 MG/G
CREAM TOPICAL BID
Qty: 1 | Refills: 0 | Status: ERX | COMMUNITY
Start: 2021-07-21

## 2021-07-21 ASSESSMENT — LOCATION SIMPLE DESCRIPTION DERM
LOCATION SIMPLE: RIGHT THIGH
LOCATION SIMPLE: RIGHT FOREARM
LOCATION SIMPLE: RIGHT PRETIBIAL REGION
LOCATION SIMPLE: LEFT KNEE
LOCATION SIMPLE: LEFT HAND
LOCATION SIMPLE: POSTERIOR SCALP
LOCATION SIMPLE: LEFT PRETIBIAL REGION
LOCATION SIMPLE: RIGHT HAND
LOCATION SIMPLE: RIGHT FOREHEAD
LOCATION SIMPLE: LEFT FOREARM

## 2021-07-21 ASSESSMENT — LOCATION DETAILED DESCRIPTION DERM
LOCATION DETAILED: RIGHT DISTAL PRETIBIAL REGION
LOCATION DETAILED: LEFT KNEE
LOCATION DETAILED: RIGHT ANTERIOR MEDIAL DISTAL THIGH
LOCATION DETAILED: LEFT ULNAR DORSAL HAND
LOCATION DETAILED: RIGHT INFERIOR OCCIPITAL SCALP
LOCATION DETAILED: LEFT PROXIMAL PRETIBIAL REGION
LOCATION DETAILED: RIGHT INFERIOR MEDIAL FOREHEAD
LOCATION DETAILED: RIGHT PROXIMAL PRETIBIAL REGION
LOCATION DETAILED: 3RD WEB SPACE RIGHT HAND
LOCATION DETAILED: LEFT PROXIMAL DORSAL FOREARM
LOCATION DETAILED: RIGHT PROXIMAL DORSAL FOREARM

## 2021-07-21 ASSESSMENT — LOCATION ZONE DERM
LOCATION ZONE: SCALP
LOCATION ZONE: LEG
LOCATION ZONE: FACE
LOCATION ZONE: ARM
LOCATION ZONE: HAND

## 2021-07-21 NOTE — PROCEDURE: BIOPSY BY PUNCH METHOD
Detail Level: Detailed
Home Suture Removal Text: Patient was provided a home suture removal kit and will remove their sutures at home.  If they have any questions or difficulties they will call the office.
Anesthesia Volume In Cc (Will Not Render If 0): 0.5
Additional Anesthesia Volume In Cc (Will Not Render If 0): 0
Validate Anticipated Plan: No
Hemostasis: None
Information: Selecting Yes will display possible errors in your note based on the variables you have selected. This validation is only offered as a suggestion for you. PLEASE NOTE THAT THE VALIDATION TEXT WILL BE REMOVED WHEN YOU FINALIZE YOUR NOTE. IF YOU WANT TO FAX A PRELIMINARY NOTE YOU WILL NEED TO TOGGLE THIS TO 'NO' IF YOU DO NOT WANT IT IN YOUR FAXED NOTE.
Dressing: bandage
Epidermal Sutures: 4-0 Nylon
Validate Note Data (See Information Below): Yes
Wound Care: Petrolatum
Billing Type: Third-Party Bill
Suture Removal: 10 days
Notification Instructions: Patient will be notified of biopsy results. However, patient instructed to call the office if not contacted within 2 weeks.
Anesthesia Type: 1% lidocaine with epinephrine
Biopsy Type: H and E
Consent: Written consent was obtained and risks were reviewed including but not limited to scarring, infection, bleeding, scabbing, incomplete removal, nerve damage and allergy to anesthesia.
Post-Care Instructions: I reviewed with the patient in detail post-care instructions. Patient is to keep the biopsy site dry overnight, and then apply bacitracin twice daily until healed. Patient may apply hydrogen peroxide soaks to remove any crusting.
Punch Size In Mm: 4

## 2021-07-29 ENCOUNTER — APPOINTMENT (OUTPATIENT)
Dept: URBAN - METROPOLITAN AREA SURGERY 18 | Age: 71
Setting detail: DERMATOLOGY
End: 2021-08-02

## 2021-07-29 DIAGNOSIS — Z48.02 ENCOUNTER FOR REMOVAL OF SUTURES: ICD-10-CM

## 2021-07-29 PROCEDURE — OTHER COUNSELING: OTHER

## 2021-07-29 ASSESSMENT — LOCATION DETAILED DESCRIPTION DERM: LOCATION DETAILED: RIGHT ANTERIOR MEDIAL DISTAL THIGH

## 2021-07-29 ASSESSMENT — LOCATION ZONE DERM: LOCATION ZONE: LEG

## 2021-07-29 ASSESSMENT — LOCATION SIMPLE DESCRIPTION DERM: LOCATION SIMPLE: RIGHT THIGH

## 2021-07-31 ENCOUNTER — RX ONLY (RX ONLY)
Age: 71
End: 2021-07-31

## 2021-07-31 RX ORDER — TRIAMCINOLONE ACETONIDE 1 MG/G
OINTMENT TOPICAL
Qty: 1 | Refills: 1 | Status: ERX | COMMUNITY
Start: 2021-07-31

## 2022-05-06 NOTE — PROCEDURE: MOHS SURGERY
06-May-2022 Manual Repair Warning Statement: We plan on removing the manually selected variable below in favor of our much easier automatic structured text blocks found in the previous tab. We decided to do this to help make the flow better and give you the full power of structured data. Manual selection is never going to be ideal in our platform and I would encourage you to avoid using manual selection from this point on, especially since I will be sunsetting this feature. It is important that you do one of two things with the customized text below. First, you can save all of the text in a word file so you can have it for future reference. Second, transfer the text to the appropriate area in the Library tab. Lastly, if there is a flap or graft type which we do not have you need to let us know right away so I can add it in before the variable is hidden. No need to panic, we plan to give you roughly 6 months to make the change.

## 2023-01-01 NOTE — PROCEDURE: MEDICATION COUNSELING
Benzoyl Peroxide Counseling: Patient counseled that medicine may cause skin irritation and bleach clothing.  In the event of skin irritation, the patient was advised to reduce the amount of the drug applied or use it less frequently.   The patient verbalized understanding of the proper use and possible adverse effects of benzoyl peroxide.  All of the patient's questions and concerns were addressed. The patient is a 30d Male complaining of fever.

## 2023-05-25 NOTE — PROCEDURE: MOHS SURGERY
Resume home amlodipine and losartan with hold parameters   Crescentic Advancement Flap Text: Given the location of the defect, inherent tension at the surgical site, and the proximity to free margins a Crescentic advancement flap was deemed most appropriate for wound reconstruction. The risks, benefits, and possible outcomes of this procedure were discussed along with the risks, benefits, and possible outcomes of other options for wound repair (including but not limited to: complex closure, other flaps, grafts, and second intention). The patient verbalized understanding and consent to the outlined procedure. The patient verbalized understanding that intraoperative conditions may necessitate a change in the outlined procedure resulting in modification of the original surgical plan. This decision may be made at the discretion of the surgeon, and is due to factors subject to change or that are difficult to predict preoperatively. Using a sterile surgical marker, an appropriate Crescentic advancement flap was drawn incorporating the defect and placing the expected incisions within the relaxed skin tension lines where possible. The surgical site and surrounding skin were prepped and draped in sterile fashion.  Standing cones were removed from opposite ends of the defect within the appropriate surgical plane, repositioning as necessary based upon local tension, proximity to free margins/other anatomic structures, and position of the relaxed skin tension lines. The shape of one standing cone was modified, designed in a crescentic shape to allow for sliding movement in both the horizontal and vertical plane.  The resulting defect was undermined widely and bilaterally in the appropriate surgical plane taking care to preserve local arteries, veins, nerves, and other structures of anatomic importance. This created a sliding flap capable of advancing across the defect to close the wound under minimal tension. Hemostasis was achieved and then the wound was sutured in layered fashion.

## 2023-07-31 NOTE — PROCEDURE: MEDICATION COUNSELING
SW confirmed with Desiree ([id number]) that pt is currently with [facility] home [facility]. SW remains available.    Methotrexate Pregnancy And Lactation Text: This medication is Pregnancy Category X and is known to cause fetal harm. This medication is excreted in breast milk.

## 2023-11-09 NOTE — PROCEDURE: MEDICATION COUNSELING
----- Message from Erika Rodgers [place] sent at 11/9/2023 10:53 AM EST -----  Regarding: FW: Rash  Contact: [phone]    ----- Message -----  From: Francies Kanner  Sent: 11/7/2023  11:31 AM EST  To: #  Subject: Rash                                             Good morning, all the other rash have heal except one in front of my stomach it was the last one that came, it's healing slow, do you want me to start back on the ozempic this week Rifampin Counseling: I discussed with the patient the risks of rifampin including but not limited to liver damage, kidney damage, red-orange body fluids, nausea/vomiting and severe allergy.

## 2024-04-26 NOTE — PROCEDURE: MEDICATION COUNSELING
If you are a smoker, it is important for your health to stop smoking. Please be aware that second hand smoke is also harmful. Griseofulvin Pregnancy And Lactation Text: This medication is Pregnancy Category X and is known to cause serious birth defects. It is unknown if this medication is excreted in breast milk but breast feeding should be avoided.

## 2024-07-09 NOTE — PROCEDURE: MEDICATION COUNSELING
dupli   High Dose Vitamin A Counseling: Side effects reviewed, pt to contact office should one occur.

## 2024-07-29 NOTE — PROCEDURE: MEDICATION COUNSELING
25.7 Cimetidine Pregnancy And Lactation Text: This medication is Pregnancy Category B and is considered safe during pregnancy. It is also excreted in breast milk and breast feeding isn't recommended.

## 2024-11-13 NOTE — PROCEDURE: MEDICATION COUNSELING
I have personally reviewed the OARRS report.  I have considered the risks of abuse, dependence, addiction and diversion.  I believe that it is clinically appropriate for patient to be prescribed this medication.      Birth Control Pills Pregnancy And Lactation Text: This medication should be avoided if pregnant and for the first 30 days post-partum.

## 2025-01-04 NOTE — PROCEDURE: MEDICATION COUNSELING
Chest Tube Insertion    Date/Time: 1/3/2025 9:13 PM    Performed by: Jordan Shi M.D.  Authorized by: Jordan Shi M.D.    Consent:     Consent obtained:  Written    Consent given by: daughter.    Risks discussed:  Bleeding, incomplete drainage, damage to surrounding structures and infection  Universal protocol:     Patient identity confirmed:  Arm band  Pre-procedure details:     Skin preparation:  Chlorhexidine  Procedure details:     Placement location:  R lateral    Scalpel size:  11    Tube size (Fr):  8    Tube connected to:  Suction  Post-procedure details:     Post-insertion x-ray findings: tube in good position      Procedure completion:  Tolerated    Jordan Shi MD, [facility]  Critical Care Medicine  9:14 PM        Siliq Counseling:  I discussed with the patient the risks of Siliq including but not limited to new or worsening depression, suicidal thoughts and behavior, immunosuppression, malignancy, posterior leukoencephalopathy syndrome, and serious infections.  The patient understands that monitoring is required including a PPD at baseline and must alert us or the primary physician if symptoms of infection or other concerning signs are noted. There is also a special program designed to monitor depression which is required with Siliq.

## 2025-03-25 NOTE — PROCEDURE: MEDICATION COUNSELING
Quality 130: Documentation Of Current Medications In The Medical Record: Current Medications Documented Quality 226: Preventive Care And Screening: Tobacco Use: Screening And Cessation Intervention: Patient screened for tobacco use and is an ex/non-smoker Quality 47: Advance Care Plan: Advance Care Planning discussed and documented; advance care plan or surrogate decision maker documented in the medical record. Detail Level: Detailed Name And Contact Information For Health Care Proxy: Loulou Roberts spouse [phone] Valtrex Pregnancy And Lactation Text: this medication is Pregnancy Category B and is considered safe during pregnancy. This medication is not directly found in breast milk but it's metabolite acyclovir is present.

## 2025-04-08 NOTE — PROCEDURE: MEDICATION COUNSELING
"Name: Krysten Sanz      : 1974      MRN: [id number]  Encounter Provider: ASA Ruiz  Encounter Date: 2025   Encounter department: [facility]  :  Assessment & Plan  Diabetic polyneuropathy associated with type 2 diabetes mellitus (HCC)  Stable  Lab Results   Component Value Date    HGBA1C 9.9 (A) 2025       Orders:  •  ramipril (ALTACE) 2.5 mg capsule; Take 1 capsule (2.5 mg total) by mouth daily    Type 1 diabetes mellitus with hyperglycemia (HCC)  Compliant with medications, she has follow-up with Endo, reminded to have labs drawn prior to follow-up visit  Lab Results   Component Value Date    HGBA1C 9.9 (A) 2025            Screening mammogram for breast cancer    Orders:  •  Mammo screening bilateral w 3d and cad; Future    Screening mammogram, encounter for    Orders:  •  US breast screening bilateral complete (ABUS); Future    Dense breast    Orders:  •  US breast screening bilateral complete (ABUS); Future    Anxiety and depression    Improved since resuming Lexapro       Primary hypertension  Stable             Tobacco Cessation Counseling: Tobacco cessation counseling was provided.       History of Present Illness   Here today for follow-up.  Mood improving since resuming her Lexapro.  She is tolerating this well.  Close levels have been better regulated.  Her vision issues have resolved.  She did follow-up with ophthalmology, reports no issues.      Review of Systems   Constitutional: Negative.    Respiratory: Negative.     Cardiovascular: Negative.    Gastrointestinal: Negative.    Neurological: Negative.        Objective   /80   Pulse 84   Temp (!) 97 °F (36.1 °C)   Resp 16   Ht 5' 2\" (1.575 m)   Wt 80.3 kg (177 lb)   LMP 2025 (Exact Date)   BMI 32.37 kg/m²      Physical Exam  Vitals and nursing note reviewed.   Constitutional:       General: She is not in acute distress.     Appearance: Normal appearance.   HENT:      Head: " Normocephalic and atraumatic.   Eyes:      Conjunctiva/sclera: Conjunctivae normal.   Neck:      Vascular: No carotid bruit.   Cardiovascular:      Rate and Rhythm: Normal rate and regular rhythm.      Pulses: Normal pulses. no weak pulses.           Dorsalis pedis pulses are 2+ on the right side and 2+ on the left side.        Posterior tibial pulses are 2+ on the right side and 2+ on the left side.      Heart sounds: Normal heart sounds. No murmur heard.  Pulmonary:      Effort: Pulmonary effort is normal.      Breath sounds: Normal breath sounds.   Feet:      Right foot:      Skin integrity: No ulcer, skin breakdown, erythema, warmth, callus or dry skin.      Left foot:      Skin integrity: No ulcer, skin breakdown, erythema, warmth, callus or dry skin.   Skin:     General: Skin is warm and dry.   Neurological:      Mental Status: She is alert.   Psychiatric:         Mood and Affect: Mood normal.         Behavior: Behavior normal.     Diabetic Foot Exam    Patient's shoes and socks removed.    Right Foot/Ankle   Right Foot Inspection  Skin Exam: skin normal and skin intact. No dry skin, no warmth, no callus, no erythema, no maceration, no abnormal color, no pre-ulcer, no ulcer and no callus.     Toe Exam: ROM and strength within normal limits.     Sensory   Vibration: intact  Proprioception: intact  Monofilament testing: diminished    Vascular  Capillary refills: < 3 seconds  The right DP pulse is 2+. The right PT pulse is 2+.     Left Foot/Ankle  Left Foot Inspection  Skin Exam: skin normal and skin intact. No dry skin, no warmth, no erythema, no maceration, normal color, no pre-ulcer, no ulcer and no callus.     Toe Exam: ROM and strength within normal limits.     Sensory   Vibration: intact  Proprioception: intact  Monofilament testing: diminished    Vascular  Capillary refills: < 3 seconds  The left DP pulse is 2+. The left PT pulse is 2+.     Assign Risk Category  No deformity present  Loss of protective  sensation  No weak pulses  Risk: 1       Rhofade Counseling: Rhofade is a topical medication which can decrease superficial blood flow where applied. Side effects are uncommon and include stinging, redness and allergic reactions.